# Patient Record
Sex: MALE | Race: WHITE | NOT HISPANIC OR LATINO | Employment: FULL TIME | URBAN - METROPOLITAN AREA
[De-identification: names, ages, dates, MRNs, and addresses within clinical notes are randomized per-mention and may not be internally consistent; named-entity substitution may affect disease eponyms.]

---

## 2017-10-03 ENCOUNTER — HOSPITAL ENCOUNTER (EMERGENCY)
Facility: HOSPITAL | Age: 32
Discharge: HOME | End: 2017-10-03
Attending: SURGERY

## 2017-10-03 VITALS
HEIGHT: 69 IN | HEART RATE: 90 BPM | OXYGEN SATURATION: 99 % | BODY MASS INDEX: 23.7 KG/M2 | DIASTOLIC BLOOD PRESSURE: 74 MMHG | RESPIRATION RATE: 20 BRPM | TEMPERATURE: 97 F | SYSTOLIC BLOOD PRESSURE: 117 MMHG | WEIGHT: 160 LBS
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
## 2017-10-03 PROCEDURE — 99283 EMERGENCY DEPT VISIT LOW MDM: CPT

## 2017-10-03 RX ORDER — KETOROLAC TROMETHAMINE 10 MG/1
10 TABLET, FILM COATED ORAL EVERY 6 HOURS PRN
Qty: 15 TABLET | Refills: 0 | Status: SHIPPED | OUTPATIENT
Start: 2017-10-03

## 2017-10-03 RX ORDER — CYCLOBENZAPRINE HCL 10 MG
10 TABLET ORAL 3 TIMES DAILY PRN
Qty: 10 TABLET | Refills: 0 | Status: SHIPPED | OUTPATIENT
Start: 2017-10-03 | End: 2017-10-08
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
## 2017-10-03 NOTE — ED NOTES
"/74   Pulse 90   Temp 96.5 °F (35.8 °C) (Oral)   Resp 20   Ht 5' 9" (1.753 m)   Wt 72.6 kg (160 lb)   SpO2 99%   BMI 23.63 kg/m²     General Appearance:    Alert, cooperative, no distress, appears stated age   Head:    Normocephalic, without obvious abnormality, atraumatic   Eyes:    PERRL, conjunctiva/corneas clear, EOM's intact, fundi     benign, both eyes        Ears:    Normal TM's and external ear canals, both ears   Nose:   Nares normal, septum midline, mucosa normal, no drainage    or sinus tenderness   Throat:   Lips, mucosa, and tongue normal; teeth and gums normal   Neck:   Supple, symmetrical, trachea midline, no adenopathy;        thyroid:  No enlargement/tenderness/nodules; no carotid    bruit or JVD   Back:     Symmetric, no curvature, ROM normal, no CVA tenderness Pt reports lower back pain/muscle spasms.    Lungs:     Clear to auscultation bilaterally, respirations unlabored   Chest wall:    No tenderness or deformity   Heart:    Regular rate and rhythm, S1 and S2 normal, no murmur, rub    or gallop   Abdomen:     Soft, non-tender, bowel sounds active all four quadrants,     no masses, no organomegaly           Extremities:   Extremities normal, atraumatic, no cyanosis or edema   Pulses:   2+ and symmetric all extremities   Skin:   Skin color, texture, turgor normal, no rashes or lesions   Lymph nodes:   Cervical, supraclavicular, and axillary nodes normal   Neurologic:   CNII-XII intact. Normal strength, sensation and reflexes       throughout     "

## 2018-01-17 ENCOUNTER — HOSPITAL ENCOUNTER (EMERGENCY)
Facility: HOSPITAL | Age: 33
Discharge: HOME | End: 2018-01-17
Attending: SURGERY

## 2018-01-17 VITALS
RESPIRATION RATE: 17 BRPM | TEMPERATURE: 100 F | WEIGHT: 160 LBS | DIASTOLIC BLOOD PRESSURE: 64 MMHG | OXYGEN SATURATION: 98 % | BODY MASS INDEX: 23.63 KG/M2 | SYSTOLIC BLOOD PRESSURE: 115 MMHG | HEART RATE: 114 BPM

## 2018-01-17 DIAGNOSIS — J02.9 PHARYNGITIS, UNSPECIFIED ETIOLOGY: Primary | ICD-10-CM

## 2018-01-17 DIAGNOSIS — J06.9 UPPER RESPIRATORY TRACT INFECTION, UNSPECIFIED TYPE: ICD-10-CM

## 2018-01-17 DIAGNOSIS — R05.9 COUGH: ICD-10-CM

## 2018-01-17 LAB
FLUAV AG SPEC QL IA: NEGATIVE
FLUBV AG SPEC QL IA: NEGATIVE
SPECIMEN SOURCE: NORMAL

## 2018-01-17 PROCEDURE — 87400 INFLUENZA A/B EACH AG IA: CPT | Mod: 59

## 2018-01-17 PROCEDURE — 25000003 PHARM REV CODE 250: Performed by: SURGERY

## 2018-01-17 PROCEDURE — 99284 EMERGENCY DEPT VISIT MOD MDM: CPT

## 2018-01-17 RX ORDER — BENZONATATE 100 MG/1
100 CAPSULE ORAL EVERY 8 HOURS PRN
Qty: 20 CAPSULE | Refills: 0 | Status: SHIPPED | OUTPATIENT
Start: 2018-01-17 | End: 2018-01-22

## 2018-01-17 RX ORDER — ACETAMINOPHEN 500 MG
1000 TABLET ORAL
Status: COMPLETED | OUTPATIENT
Start: 2018-01-17 | End: 2018-01-17

## 2018-01-17 RX ORDER — AMOXICILLIN AND CLAVULANATE POTASSIUM 875; 125 MG/1; MG/1
1 TABLET, FILM COATED ORAL EVERY 12 HOURS
Qty: 14 TABLET | Refills: 0 | Status: SHIPPED | OUTPATIENT
Start: 2018-01-17 | End: 2018-01-24

## 2018-01-17 RX ADMIN — ACETAMINOPHEN 1000 MG: 500 TABLET ORAL at 02:01

## 2018-01-17 NOTE — DISCHARGE INSTRUCTIONS
**Drink plenty fluids. Get plenty rest. Over the counter tylenol or motrin as needed for pain and/or fever as directed on package insert. Wash hands frequently.    **Our goal in the emergency department is to always give you outstanding care and exceptional service. You may receive a survey by mail or e-mail in the next week regarding your experience in our ED. We would greatly appreciate your completing and returning the survey. Your feedback provides us with a way to recognize our staff who give very good care and it helps us learn how to improve when your experience was below our aspiration of excellence.     **Return to the ER as needed.

## 2018-01-17 NOTE — ED PROVIDER NOTES
Encounter Date: 1/17/2018       History     Chief Complaint   Patient presents with    Fever     x 3 days    Generalized Body Aches     The history is provided by the patient.   URI   The primary symptoms include fever, fatigue, sore throat, cough, vomiting and myalgias. Primary symptoms do not include headaches, ear pain, wheezing, abdominal pain, nausea, arthralgias or rash. Illness onset: 3 days ago. This is a new problem. The problem has been gradually worsening. The maximum temperature recorded prior to his arrival was unknown (101.6F here in ER).   The sore throat is not accompanied by trouble swallowing, drooling, hoarse voice or stridor.   The cough is productive.   Onset: 1 episode 2 days ago and 4 episodes yesterday, no vomiting today. The emesis contains stomach contents.   The myalgias are generalized. The myalgias are aching. The myalgias are not associated with weakness, tenderness or swelling.   Symptoms associated with the illness include chills, sinus pressure, congestion and rhinorrhea.     Review of patient's allergies indicates:  No Known Allergies  History reviewed. No pertinent past medical history.  Past Surgical History:   Procedure Laterality Date    HERNIA REPAIR       History reviewed. No pertinent family history.  Social History   Substance Use Topics    Smoking status: Current Every Day Smoker     Packs/day: 1.00     Years: 10.00     Types: Cigarettes    Smokeless tobacco: Never Used    Alcohol use Yes      Comment: socially     Review of Systems   Constitutional: Positive for chills, fatigue and fever.   HENT: Positive for congestion, postnasal drip, rhinorrhea, sinus pressure and sore throat. Negative for dental problem, drooling, ear pain, hoarse voice and trouble swallowing.    Eyes: Negative for pain, discharge, redness and visual disturbance.   Respiratory: Positive for cough. Negative for chest tightness, shortness of breath, wheezing and stridor.    Cardiovascular: Negative  for chest pain, palpitations and leg swelling.   Gastrointestinal: Positive for vomiting. Negative for abdominal pain, constipation, diarrhea and nausea.   Genitourinary: Negative for difficulty urinating, dysuria, flank pain, frequency, hematuria and urgency.   Musculoskeletal: Positive for myalgias. Negative for arthralgias and back pain.   Skin: Negative for color change, pallor and rash.   Neurological: Negative for seizures, weakness and headaches.   Psychiatric/Behavioral: Negative.        Physical Exam     Initial Vitals [01/17/18 1409]   BP Pulse Resp Temp SpO2   134/83 (!) 120 17 (!) 101.6 °F (38.7 °C) 98 %      MAP       100         Physical Exam    Nursing note and vitals reviewed.  Constitutional: He appears well-developed and well-nourished.   HENT:   Head: Normocephalic and atraumatic.   Right Ear: Tympanic membrane and ear canal normal.   Left Ear: Tympanic membrane and ear canal normal.   Nose: Rhinorrhea present. Right sinus exhibits frontal sinus tenderness. Right sinus exhibits no maxillary sinus tenderness. Left sinus exhibits frontal sinus tenderness. Left sinus exhibits no maxillary sinus tenderness.   Mouth/Throat: Oropharyngeal exudate and posterior oropharyngeal erythema present.   Clear post nasal drip noted. Erythema bilateral nasal mucosa with clear nasal discharge noted.   Eyes: Conjunctivae, EOM and lids are normal. Pupils are equal, round, and reactive to light.   Neck: Normal range of motion. Neck supple.   Cardiovascular: Regular rhythm, normal heart sounds and intact distal pulses. Tachycardia present.    Pulmonary/Chest: No respiratory distress. He has decreased breath sounds. He has no wheezes. He has no rhonchi. He has no rales.   Abdominal: Soft. Bowel sounds are normal. He exhibits no distension. There is no tenderness.   Musculoskeletal: Normal range of motion.   Lymphadenopathy:     He has no cervical adenopathy.   Neurological: He is alert and oriented to person, place, and  time. He has normal strength.   Skin: Skin is warm and dry. Capillary refill takes less than 2 seconds. No rash noted.   Psychiatric: He has a normal mood and affect. His behavior is normal.         ED Course   Procedures  Labs Reviewed   INFLUENZA A AND B ANTIGEN    Influenza was negative.         X-Rays:   Independently Interpreted Readings:   Other Readings:  X-ray reviewed with MD. X-ray without concerning findings.         Medications   acetaminophen tablet 1,000 mg (1,000 mg Oral Given 1/17/18 1416)     Temp 99.5 after administration of tylenol.                 ED Course      Clinical Impression:   The primary encounter diagnosis was Pharyngitis, unspecified etiology. Diagnoses of Upper respiratory tract infection, unspecified type and Cough were also pertinent to this visit.    Disposition:   Disposition: Discharged  Condition: Stable     The patient acknowledges that close follow up with medical provider is required. Instructed to follow up with PCP within 2 days. The patient agrees to comply with all instruction and directions given in the ER.     Discharge Medication List as of 1/17/2018  3:17 PM      START taking these medications    Details   amoxicillin-clavulanate 875-125mg (AUGMENTIN) 875-125 mg per tablet Take 1 tablet by mouth every 12 (twelve) hours., Starting Wed 1/17/2018, Until Wed 1/24/2018, Print      benzonatate (TESSALON) 100 MG capsule Take 1 capsule (100 mg total) by mouth every 8 (eight) hours as needed for Cough., Starting Wed 1/17/2018, Until Mon 1/22/2018, Print                                 Mckenna Hamm NP  01/17/18 5519

## 2019-10-12 ENCOUNTER — HOSPITAL ENCOUNTER (EMERGENCY)
Facility: HOSPITAL | Age: 34
Discharge: HOME | End: 2019-10-12
Attending: SURGERY
Payer: MEDICAID

## 2019-10-12 VITALS
OXYGEN SATURATION: 98 % | BODY MASS INDEX: 22.32 KG/M2 | HEART RATE: 87 BPM | SYSTOLIC BLOOD PRESSURE: 113 MMHG | TEMPERATURE: 98 F | DIASTOLIC BLOOD PRESSURE: 79 MMHG | WEIGHT: 151.13 LBS | RESPIRATION RATE: 19 BRPM

## 2019-10-12 DIAGNOSIS — F15.10 METHAMPHETAMINE ABUSE: Primary | ICD-10-CM

## 2019-10-12 LAB
ALBUMIN SERPL BCP-MCNC: 4.3 G/DL (ref 3.5–5.2)
ALP SERPL-CCNC: 80 U/L (ref 55–135)
ALT SERPL W/O P-5'-P-CCNC: 21 U/L (ref 10–44)
AMORPH CRY URNS QL MICRO: ABNORMAL
AMPHET+METHAMPHET UR QL: ABNORMAL
ANION GAP SERPL CALC-SCNC: 11 MMOL/L (ref 8–16)
APAP SERPL-MCNC: <3 UG/ML (ref 10–20)
AST SERPL-CCNC: 25 U/L (ref 10–40)
BACTERIA #/AREA URNS HPF: ABNORMAL /HPF
BARBITURATES UR QL SCN>200 NG/ML: NEGATIVE
BASOPHILS # BLD AUTO: 0.04 K/UL (ref 0–0.2)
BASOPHILS NFR BLD: 0.3 % (ref 0–1.9)
BENZODIAZ UR QL SCN>200 NG/ML: NEGATIVE
BILIRUB SERPL-MCNC: 0.6 MG/DL (ref 0.1–1)
BILIRUB UR QL STRIP: ABNORMAL
BUN SERPL-MCNC: 13 MG/DL (ref 6–20)
BZE UR QL SCN: NEGATIVE
CALCIUM SERPL-MCNC: 9.5 MG/DL (ref 8.7–10.5)
CANNABINOIDS UR QL SCN: NEGATIVE
CHLORIDE SERPL-SCNC: 104 MMOL/L (ref 95–110)
CLARITY UR: ABNORMAL
CO2 SERPL-SCNC: 25 MMOL/L (ref 23–29)
COLOR UR: YELLOW
CREAT SERPL-MCNC: 1 MG/DL (ref 0.5–1.4)
CREAT UR-MCNC: 398.4 MG/DL (ref 23–375)
DIFFERENTIAL METHOD: ABNORMAL
EOSINOPHIL # BLD AUTO: 0 K/UL (ref 0–0.5)
EOSINOPHIL NFR BLD: 0.1 % (ref 0–8)
ERYTHROCYTE [DISTWIDTH] IN BLOOD BY AUTOMATED COUNT: 12.5 % (ref 11.5–14.5)
EST. GFR  (AFRICAN AMERICAN): >60 ML/MIN/1.73 M^2
EST. GFR  (NON AFRICAN AMERICAN): >60 ML/MIN/1.73 M^2
ETHANOL SERPL-MCNC: <10 MG/DL
GLUCOSE SERPL-MCNC: 96 MG/DL (ref 70–110)
GLUCOSE UR QL STRIP: NEGATIVE
HCT VFR BLD AUTO: 44.4 % (ref 40–54)
HGB BLD-MCNC: 14.8 G/DL (ref 14–18)
HGB UR QL STRIP: NEGATIVE
HYALINE CASTS #/AREA URNS LPF: 0 /LPF
IMM GRANULOCYTES # BLD AUTO: 0.05 K/UL (ref 0–0.04)
IMM GRANULOCYTES NFR BLD AUTO: 0.3 % (ref 0–0.5)
KETONES UR QL STRIP: ABNORMAL
LEUKOCYTE ESTERASE UR QL STRIP: NEGATIVE
LYMPHOCYTES # BLD AUTO: 1 K/UL (ref 1–4.8)
LYMPHOCYTES NFR BLD: 7.1 % (ref 18–48)
MCH RBC QN AUTO: 30.7 PG (ref 27–31)
MCHC RBC AUTO-ENTMCNC: 33.3 G/DL (ref 32–36)
MCV RBC AUTO: 92 FL (ref 82–98)
METHADONE UR QL SCN>300 NG/ML: NEGATIVE
MICROSCOPIC COMMENT: ABNORMAL
MONOCYTES # BLD AUTO: 1 K/UL (ref 0.3–1)
MONOCYTES NFR BLD: 7.1 % (ref 4–15)
NEUTROPHILS # BLD AUTO: 12.2 K/UL (ref 1.8–7.7)
NEUTROPHILS NFR BLD: 85.1 % (ref 38–73)
NITRITE UR QL STRIP: NEGATIVE
NRBC BLD-RTO: 0 /100 WBC
OPIATES UR QL SCN: NEGATIVE
PCP UR QL SCN>25 NG/ML: NEGATIVE
PH UR STRIP: 6 [PH] (ref 5–8)
PLATELET # BLD AUTO: 262 K/UL (ref 150–350)
PMV BLD AUTO: 9.2 FL (ref 9.2–12.9)
POTASSIUM SERPL-SCNC: 3.9 MMOL/L (ref 3.5–5.1)
PROT SERPL-MCNC: 7.6 G/DL (ref 6–8.4)
PROT UR QL STRIP: ABNORMAL
RBC # BLD AUTO: 4.82 M/UL (ref 4.6–6.2)
RBC #/AREA URNS HPF: 1 /HPF (ref 0–4)
SALICYLATES SERPL-MCNC: <5 MG/DL (ref 15–30)
SODIUM SERPL-SCNC: 140 MMOL/L (ref 136–145)
SP GR UR STRIP: >=1.03 (ref 1–1.03)
TOXICOLOGY INFORMATION: ABNORMAL
TSH SERPL DL<=0.005 MIU/L-ACNC: 0.52 UIU/ML (ref 0.4–4)
URN SPEC COLLECT METH UR: ABNORMAL
UROBILINOGEN UR STRIP-ACNC: NEGATIVE EU/DL
WBC # BLD AUTO: 14.32 K/UL (ref 3.9–12.7)
WBC #/AREA URNS HPF: 2 /HPF (ref 0–5)

## 2019-10-12 PROCEDURE — 81000 URINALYSIS NONAUTO W/SCOPE: CPT | Mod: 59

## 2019-10-12 PROCEDURE — 80053 COMPREHEN METABOLIC PANEL: CPT

## 2019-10-12 PROCEDURE — 36415 COLL VENOUS BLD VENIPUNCTURE: CPT

## 2019-10-12 PROCEDURE — 85025 COMPLETE CBC W/AUTO DIFF WBC: CPT

## 2019-10-12 PROCEDURE — 80329 ANALGESICS NON-OPIOID 1 OR 2: CPT

## 2019-10-12 PROCEDURE — 80307 DRUG TEST PRSMV CHEM ANLYZR: CPT

## 2019-10-12 PROCEDURE — 99283 EMERGENCY DEPT VISIT LOW MDM: CPT

## 2019-10-12 PROCEDURE — 80320 DRUG SCREEN QUANTALCOHOLS: CPT

## 2019-10-12 PROCEDURE — 84443 ASSAY THYROID STIM HORMONE: CPT

## 2019-10-13 NOTE — ED PROVIDER NOTES
"[facility]                                                 Chief Complaint  34 y.o. male with Addiction Problem   -- Patient stated "I stated using meth again today."   -- Denies HI and SI.     History of Present Illness  Bhargav Mckeon presents to the emergency room for information regarding rehab  Patient has been seeing people out patient for methamphetamine abuse recently  Patient relapsed today but states he is not suicidal or homicidal, not psychotic  Patient came to the ER asking for help in resources regarding rehab placement  [facility]  He states he has no psychiatric issues other than methamphetamine addiction  Patient is requesting information regarding rehab, he would like outpatient info    The history is provided by the patient   device was not used during this ER visit  History reviewed. No pertinent past medical history.   Surgeries: Hernia repair  No Known Allergies     I have reviewed all of this patient's past medical, surgical, family, and social   histories as well as active allergies and medications documented in the  electronic medical record    Review of Systems and Physical Exam      Review of Systems  -- Constitution - no fever, denies fatigue, no weakness, no chills  -- Eyes - no tearing or redness, no visual disturbance  -- Ear, Nose - no tinnitus or earache, no nasal congestion or discharge  -- Mouth,Throat - no sore throat, no toothache, normal voice, normal swallowing  -- Respiratory - denies cough and congestion, no shortness of breath, no MAURICE  -- Cardiovascular - denies chest pain, no palpitations, denies claudication  -- Gastrointestinal - denies abdominal pain, nausea, vomiting, or diarrhea  -- Genitourinary - no dysuria, denies flank pain, no hematuria, no STD risk  -- Musculoskeletal - denies back pain, negative for trauma or injury  -- Neurological - no headache, denies " weakness or seizure; no LOC  -- Skin - denies pallor, rash, or changes in skin. no hives or welts noted  -- Psychiatric - methamphetamine addiction with no suicidal ideation    Vital Signs  His temperature is 98.4 °F (36.9 °C).   His blood pressure is 126/78 and his pulse is 92.   His respiration is 19 and oxygen saturation is 99%.     Physical Exam  -- Nursing note and vitals reviewed  -- Constitutional: Appears well-developed and well-nourished  -- Head: Atraumatic. Normocephalic. No obvious abnormality  -- Eyes: Pupils are equal and reactive to light. Normal conjunctiva and lids  -- Cardiac: Normal rate, regular rhythm and normal heart sounds  -- Pulmonary: Normal respiratory effort, breath sounds clear to auscultation  -- Abdominal: Soft, no tenderness. Normal bowel sounds. Normal liver edge  -- Musculoskeletal: Normal range of motion, no effusions. Joints stable   -- Neurological: No focal deficits. Showed good interaction with staff  -- Vascular: Posterior tibial, dorsalis pedis and radial pulses 2+ bilaterally    -- Lymphatics: No cervical or peripheral lymphadenopathy. No edema noted  -- Skin: Warm and dry. No evidence of rash or cellulitis  -- Psychiatric: Normal mood and affect. Bedside behavior is appropriate    Emergency Room Course      Treatment and Evaluation  -- The electrolytes drawn in the ER today were within normal limits.   -- The CBC drawn in the ER today was within normal limits   -- The tylenol level drawn today was within normal limits  -- The salicylate level drawn today was within normal limits  -- The TSH level drawn today was within normal limits  -- The urinalysis given today was within normal limits  -- The urine drug screen given today was negative  -- The ethanol level drawn today in the ER was zero    ED Physician Management  -- Diagnosis management comments: 34 y.o. male with addiction issues  -- not suicidal homicidal on refuses psychiatric placement tonight  -- the patient would  like outpatient rehab information this evening from here  -- I gave the patient 2 numbers to the local mental health clinics for rehab  -- these clinics typically placed people in outpatient/in patient rehab    Diagnosis  -- The encounter diagnosis was Methamphetamine abuse.    Disposition and Plan  -- Disposition: home  -- Condition: stable  -- Follow-up: Patient to follow up with Primary Doctor No in 1-2 days.  -- I advised the patient that we have found no life threatening condition today  -- At this time, I believe the patient is clinically stable for discharge.   -- The patient acknowledges that close follow up with a MD is required   -- Patient agrees to comply with all instruction and direction given in the ER    This note is dictated on M*Modal word recognition program.  There are word recognition mistakes that are occasionally missed on review.         Olaf Arellano MD  10/12/19 8847

## 2024-06-15 PROBLEM — L03.90 CELLULITIS: Status: ACTIVE | Noted: 2024-06-15

## 2024-06-15 PROBLEM — B35.3 TINEA PEDIS: Status: ACTIVE | Noted: 2024-06-15

## 2024-06-20 ENCOUNTER — PATIENT OUTREACH (OUTPATIENT)
Dept: ADMINISTRATIVE | Facility: CLINIC | Age: 39
End: 2024-06-20

## 2024-06-20 NOTE — PROGRESS NOTES
C3 nurse spoke with Bhargav Mckeon  for a TCC post hospital discharge follow up call. The patient has a scheduled HOSFU appointment with [facility] on 06/26/2024 @ 7194.    Message sent to PCP staff.     done

## 2024-11-07 ENCOUNTER — HOSPITAL ENCOUNTER (EMERGENCY)
Facility: HOSPITAL | Age: 39
Discharge: HOME | End: 2024-11-07
Attending: STUDENT IN AN ORGANIZED HEALTH CARE EDUCATION/TRAINING PROGRAM
Payer: COMMERCIAL

## 2024-11-07 VITALS
HEART RATE: 94 BPM | WEIGHT: 160 LBS | SYSTOLIC BLOOD PRESSURE: 130 MMHG | BODY MASS INDEX: 23.7 KG/M2 | RESPIRATION RATE: 18 BRPM | DIASTOLIC BLOOD PRESSURE: 80 MMHG | HEIGHT: 69 IN | OXYGEN SATURATION: 100 % | TEMPERATURE: 98 F

## 2024-11-07 DIAGNOSIS — T50.905A ADVERSE EFFECT OF DRUG, INITIAL ENCOUNTER: Primary | ICD-10-CM

## 2024-11-07 LAB — POCT GLUCOSE: 120 MG/DL (ref 70–110)

## 2024-11-07 PROCEDURE — 99283 EMERGENCY DEPT VISIT LOW MDM: CPT

## 2024-11-07 PROCEDURE — 82962 GLUCOSE BLOOD TEST: CPT

## 2024-11-07 RX ORDER — DOXYCYCLINE 100 MG/1
1 TABLET ORAL 2 TIMES DAILY
COMMUNITY
Start: 2024-10-18

## 2024-11-07 NOTE — ED PROVIDER NOTES
"  History  Chief Complaint   Patient presents with    Drug Reaction     Pt here via ems after smoking weed 5 hours pta, patient states he was at the [employer] and smoked some weed and ever since he feels like the floor is moving and that he just feels off.      39-year-old male presents for evaluation of a drug reaction.  Patient presents via EMS.  Patient reportedly smoked weed about 5 hours ago.  Since that time patient states he was not felt right.  Patient feels as though the floor is moving and just feels off.  No other drug usage reported.  No other systemic symptoms reported.        History reviewed. No pertinent past medical history.    Past Surgical History:   Procedure Laterality Date    HERNIA REPAIR         No family history on file.    Social History     Tobacco Use    Smoking status: Every Day     Current packs/day: 0.50     Average packs/day: 0.8 packs/day for 20.0 years (15.0 ttl pk-yrs)     Types: Cigarettes     Start date: 11/7/2014    Smokeless tobacco: Never   Substance Use Topics    Alcohol use: Yes     Comment: socially    Drug use: Not Currently     Types: Methamphetamines     Comment: Patient stated "I just started today using again."       ROS  Review of Systems   Constitutional:  Negative for fever.   HENT:  Negative for congestion.    Eyes:  Negative for visual disturbance.   Respiratory:  Negative for cough and shortness of breath.    Cardiovascular:  Negative for chest pain.   Gastrointestinal:  Negative for abdominal pain, nausea and vomiting.   Genitourinary:  Negative for dysuria.   Musculoskeletal:  Negative for arthralgias.   Skin:  Negative for color change.   Allergic/Immunologic: Negative for immunocompromised state.   Neurological:  Negative for headaches.   Hematological:  Negative for adenopathy. Does not bruise/bleed easily.       Physical Exam  /88 (BP Location: Left arm, Patient Position: Sitting)   Pulse 103   Temp 97.8 °F (36.6 °C) (Oral)   Resp 20   Ht 5' 9" " (1.753 m)   Wt 72.6 kg (160 lb)   SpO2 99%   BMI 23.63 kg/m²   Physical Exam    Constitutional: He appears well-developed and well-nourished. He is cooperative.   HENT:   Head: Normocephalic and atraumatic.   Eyes: Conjunctivae, EOM and lids are normal. Pupils are equal, round, and reactive to light.   Neck: Phonation normal.   Normal range of motion.  Cardiovascular:  Normal rate, regular rhythm and intact distal pulses.           Pulmonary/Chest: Breath sounds normal. No respiratory distress.   Musculoskeletal:         General: No tenderness or edema. Normal range of motion.      Cervical back: Normal range of motion.     Neurological: He is alert and oriented to person, place, and time. He has normal strength. No cranial nerve deficit or sensory deficit. Coordination and gait normal. GCS eye subscore is 4. GCS verbal subscore is 5. GCS motor subscore is 6.   Skin: Skin is warm and dry.   Psychiatric: He has a normal mood and affect. His speech is normal and behavior is normal.               Labs Reviewed   POCT GLUCOSE - Abnormal       Result Value    POCT Glucose 120 (*)            Imaging Results    None                     Procedures             Medical Decision Making  Suspect feeling unwell related to drug intoxication.  Will obtain blood glucose and monitored in the ER.    Amount and/or Complexity of Data Reviewed  Labs:  Decision-making details documented in ED Course.               ED Course as of 11/07/24 0527   u Nov 07, 2024   0413 POCT Glucose(!): 120 [NA]   0524 Patient feeling well, food given.  No alteration in mental status.  Will discharge. [NA]      ED Course User Index  [NA] Kym Del Valle MD       DISCHARGE NOTE:       Bhargav Mckeon's  results have been reviewed with him.  He has been counseled regarding his diagnosis, treatment, and plan.  He verbally conveys understanding and agreement of the signs, symptoms, diagnosis, treatment and prognosis and additionally agrees to follow  up as discussed.  He also agrees with the care-plan and conveys that all of his questions have been answered.  I have also provided discharge instructions for him that include: educational information regarding their diagnosis and treatment, and list of reasons why they would want to return to the ED prior to their follow-up appointment, should his condition change. He has been provided with education for proper emergency department utilization.      CLINICAL IMPRESSION:         1. Adverse effect of drug, initial encounter              PLAN:   1. Discharge  2.   New Prescriptions    No medications on file     3. [address] [address]  [phone]    Schedule an appointment as soon as possible for a visit   As needed          Kym Del Valle MD  11/07/24 0510